# Patient Record
Sex: MALE | Race: AMERICAN INDIAN OR ALASKA NATIVE | NOT HISPANIC OR LATINO | Employment: FULL TIME | URBAN - METROPOLITAN AREA
[De-identification: names, ages, dates, MRNs, and addresses within clinical notes are randomized per-mention and may not be internally consistent; named-entity substitution may affect disease eponyms.]

---

## 2024-05-15 ENCOUNTER — OFFICE VISIT (OUTPATIENT)
Age: 44
End: 2024-05-15
Payer: COMMERCIAL

## 2024-05-15 VITALS
HEART RATE: 95 BPM | BODY MASS INDEX: 33.07 KG/M2 | DIASTOLIC BLOOD PRESSURE: 90 MMHG | SYSTOLIC BLOOD PRESSURE: 140 MMHG | WEIGHT: 231 LBS | HEIGHT: 70 IN | OXYGEN SATURATION: 97 % | TEMPERATURE: 94.6 F

## 2024-05-15 DIAGNOSIS — R52 PAIN: ICD-10-CM

## 2024-05-15 DIAGNOSIS — R53.83 OTHER FATIGUE: ICD-10-CM

## 2024-05-15 DIAGNOSIS — E66.9 OBESITY (BMI 30-39.9): ICD-10-CM

## 2024-05-15 DIAGNOSIS — L30.9 ECZEMA, UNSPECIFIED TYPE: ICD-10-CM

## 2024-05-15 DIAGNOSIS — Z13.6 ENCOUNTER FOR LIPID SCREENING FOR CARDIOVASCULAR DISEASE: ICD-10-CM

## 2024-05-15 DIAGNOSIS — Z13.220 ENCOUNTER FOR LIPID SCREENING FOR CARDIOVASCULAR DISEASE: ICD-10-CM

## 2024-05-15 DIAGNOSIS — B02.9 HERPES ZOSTER WITHOUT COMPLICATION: Primary | ICD-10-CM

## 2024-05-15 DIAGNOSIS — Z11.59 ENCOUNTER FOR HEPATITIS C SCREENING TEST FOR LOW RISK PATIENT: ICD-10-CM

## 2024-05-15 DIAGNOSIS — Z11.4 ENCOUNTER FOR SCREENING FOR HIV: ICD-10-CM

## 2024-05-15 DIAGNOSIS — R03.0 ELEVATED BLOOD PRESSURE READING: ICD-10-CM

## 2024-05-15 PROCEDURE — 99204 OFFICE O/P NEW MOD 45 MIN: CPT

## 2024-05-15 RX ORDER — PREDNISONE 10 MG/1
10 TABLET ORAL DAILY
COMMUNITY

## 2024-05-15 RX ORDER — GABAPENTIN 300 MG/1
300 CAPSULE ORAL 3 TIMES DAILY
Qty: 30 CAPSULE | Refills: 0 | Status: SHIPPED | OUTPATIENT
Start: 2024-05-15 | End: 2024-05-26 | Stop reason: SDUPTHER

## 2024-05-15 RX ORDER — CEPHALEXIN 500 MG/1
500 CAPSULE ORAL 4 TIMES DAILY
COMMUNITY

## 2024-05-15 RX ORDER — VALACYCLOVIR HYDROCHLORIDE 1 G/1
1000 TABLET, FILM COATED ORAL 3 TIMES DAILY
Qty: 21 TABLET | Refills: 0 | Status: SHIPPED | OUTPATIENT
Start: 2024-05-15 | End: 2024-05-22

## 2024-05-15 NOTE — PROGRESS NOTES
Ambulatory Visit  Name: Keith Stafford      : 1980      MRN: [id number]  Encounter Provider: Juan Antonio Perez PA-C  Encounter Date: 5/15/2024   Encounter department: [facility] [place]    Assessment & Plan   1. Herpes zoster without complication  Comments:  Discussed precautions to prevent spread.  Orders:  -     valACYclovir (VALTREX) 1,000 mg tablet; Take 1 tablet (1,000 mg total) by mouth 3 (three) times a day for 7 days  -     gabapentin (Neurontin) 300 mg capsule; Take 1 capsule (300 mg total) by mouth 3 (three) times a day for 10 days  2. Pain  -     gabapentin (Neurontin) 300 mg capsule; Take 1 capsule (300 mg total) by mouth 3 (three) times a day for 10 days  3. Elevated blood pressure reading  Comments:  Will recheck at next visit for annual in three months. Discussed can be pain related. No hx of htn  4. Other fatigue  -     Comprehensive metabolic panel; Future  -     CBC and differential; Future  5. Encounter for lipid screening for cardiovascular disease  -     Lipid panel; Future  6. Encounter for screening for HIV  -     HIV 1/2 AG/AB w Reflex [facility] for 2 yr old and above; Future  7. Encounter for hepatitis C screening test for low risk patient  -     Hepatitis C antibody; Future  8. Eczema, unspecified type  Comments:  Follows w derm has appt coming up  9. Obesity (BMI 30-39.9)  Comments:  In pmh, will discuss at [facility]. Labs ordered.      Depression Screening and Follow-up Plan: Patient was screened for depression during today's encounter. They screened negative with a PHQ-2 score of 0.      History of Present Illness     Spider bites Since last week, ones located on his lower back are black in the middle and has a red ring, on the right side of his private area are red and the bites are swollen, sensitive to the touch on his private area. Helped his son moves Thursday and they slept on the floor noticed the bites Friday. Son didn't have bites. Patient never saw spiders.    Satutday Sunday had chills, felt hot.   Went to urgent care given steroid and abx yesterday. Has been helping a little.    Insect Bite  Associated symptoms include fatigue and a rash. Pertinent negatives include no chest pain, coughing or fever.       Review of Systems   Constitutional:  Positive for fatigue. Negative for activity change, appetite change, fever and unexpected weight change.   HENT: Negative.     Eyes: Negative.    Respiratory:  Negative for cough, chest tightness and shortness of breath.    Cardiovascular:  Negative for chest pain, palpitations and leg swelling.   Gastrointestinal: Negative.    Endocrine: Negative.    Genitourinary: Negative.    Musculoskeletal: Negative.    Skin:  Positive for rash. Negative for color change, pallor and wound.   Neurological: Negative.    Hematological: Negative.    Psychiatric/Behavioral: Negative.       Pertinent Medical History         Medical History Reviewed by provider this encounter:  Tobacco  Allergies  Meds  Problems  Med Hx  Surg Hx  Fam Hx       Past Medical History   History reviewed. No pertinent past medical history.  History reviewed. No pertinent surgical history.  History reviewed. No pertinent family history.  Current Outpatient Medications on File Prior to Visit   Medication Sig Dispense Refill    cephalexin (KEFLEX) 500 mg capsule Take 500 mg by mouth 4 (four) times a day For 7 days      predniSONE 10 mg tablet Take 10 mg by mouth daily Take 3 tabs  a day times 1 day, 2 tabs a day  times 2 days then 1 tab a day for 2 days       No current facility-administered medications on file prior to visit.   No Known Allergies   Current Outpatient Medications on File Prior to Visit   Medication Sig Dispense Refill    cephalexin (KEFLEX) 500 mg capsule Take 500 mg by mouth 4 (four) times a day For 7 days      predniSONE 10 mg tablet Take 10 mg by mouth daily Take 3 tabs  a day times 1 day, 2 tabs a day  times 2 days then 1 tab a day for 2 days    "    No current facility-administered medications on file prior to visit.      Social History     Tobacco Use    Smoking status: Never    Smokeless tobacco: Never   Vaping Use    Vaping status: Never Used   Substance and Sexual Activity    Alcohol use: Yes     Comment: socially    Drug use: Never    Sexual activity: Not on file     Objective     /90   Pulse 95   Temp (!) 94.6 °F (34.8 °C)   Ht 5' 10\" (1.778 m)   Wt 105 kg (231 lb)   SpO2 97%   BMI 33.15 kg/m²     Physical Exam  Vitals and nursing note reviewed.   Constitutional:       General: He is not in acute distress.     Appearance: He is obese. He is not ill-appearing, toxic-appearing or diaphoretic.   HENT:      Head: Normocephalic and atraumatic.      Nose: Nose normal.   Eyes:      General: No scleral icterus.        Right eye: No discharge.         Left eye: No discharge.      Extraocular Movements: Extraocular movements intact.      Conjunctiva/sclera: Conjunctivae normal.   Cardiovascular:      Rate and Rhythm: Normal rate and regular rhythm.      Heart sounds: No murmur heard.  Pulmonary:      Effort: Pulmonary effort is normal.      Breath sounds: Normal breath sounds.   Genitourinary:             Comments: Vesicles, large and raised in various healing stages, some crusted over. Never had shingles before.    Is itchy and painful  Musculoskeletal:      Cervical back: Normal range of motion and neck supple.   Skin:     Findings: No rash.   Neurological:      Mental Status: He is alert. Mental status is at baseline.   Psychiatric:         Mood and Affect: Mood normal.         Behavior: Behavior normal.         Thought Content: Thought content normal.         Judgment: Judgment normal.       Juan Antonio Perez PA-C      "

## 2024-05-16 ENCOUNTER — TELEPHONE (OUTPATIENT)
Age: 44
End: 2024-05-16

## 2024-05-16 NOTE — TELEPHONE ENCOUNTER
Pt requesting stronger pain medication due to his newly diagnosed shingles. Pt states the medication ( gabapentin (Neurontin) 300 mg capsule ) isn't working.  Please further advise patient @[phone].  Thank you

## 2024-05-18 ENCOUNTER — NURSE TRIAGE (OUTPATIENT)
Dept: OTHER | Facility: OTHER | Age: 44
End: 2024-05-18

## 2024-05-18 NOTE — TELEPHONE ENCOUNTER
"Regarding: shingles/med request  ----- Message from Juana LAMBERT sent at 5/18/2024 12:43 PM EDT -----  Pt states \"I have shingles and I need steroids\"    "

## 2024-05-18 NOTE — TELEPHONE ENCOUNTER
"Answer Assessment - Initial Assessment Questions  1. APPEARANCE of RASH: \"Describe the rash.\"       Rash has spread since last night.     2. LOCATION: \"Where is the rash located?\"       Right buttock and down to anus and testicles    3. ONSET: \"When did the rash start?\"       Started one week ago.     4. ITCHING: \"Does the rash itch?\" If Yes, ask: \"How bad is the itch?\"  (Scale 1-10; or mild, moderate, severe)      Yes    5. PAIN: \"Does the rash hurt?\" If Yes, ask: \"How bad is the pain?\"  (Scale 1-10; or mild, moderate, severe)      5/10 but can go as high as 10/10    6. OTHER SYMPTOMS: \"Do you have any other symptoms?\" (e.g., fever)      No fever.    Protocols used: Shingles-ADULT-AH      Pt states before being diagnosed with Shingles he was on a prednisone taper for suspected spider bites. Pt states that his rash has spread and he is having an increase in pain. Is asking if he should be restarted on steroid?    Per on call PCP- steroids would not be advised at this time and can make symptoms worse since they suppress immune response. Pt should continue his current course of Valtrex and gabapentin and can take Tylenol 1000mg Q 6 hrs round the clock for up to 4g/day. Can also continue using lidocaine cream as previously recommended.     Pt aware and verbalized understanding.   "

## 2024-05-26 ENCOUNTER — NURSE TRIAGE (OUTPATIENT)
Dept: OTHER | Facility: OTHER | Age: 44
End: 2024-05-26

## 2024-05-26 DIAGNOSIS — R52 PAIN: ICD-10-CM

## 2024-05-26 DIAGNOSIS — B02.9 HERPES ZOSTER WITHOUT COMPLICATION: ICD-10-CM

## 2024-05-26 RX ORDER — GABAPENTIN 300 MG/1
300 CAPSULE ORAL 3 TIMES DAILY
Qty: 30 CAPSULE | Refills: 0 | Status: SHIPPED | OUTPATIENT
Start: 2024-05-26 | End: 2024-06-05

## 2024-05-26 NOTE — TELEPHONE ENCOUNTER
On call provider contacted, gave a verbal order to send in a refill on the patient's Gabapentin prescription. Prescription refill sent to the patient's designated pharmacy. Patient made aware and verbalized understanding.  Reason for Disposition  • [1] Prescription refill request for ESSENTIAL medicine (i.e., likelihood of harm to patient if not taken) AND [2] triager unable to refill per department policy    Protocols used: Medication Refill and Renewal Call-ADULT-

## 2024-05-26 NOTE — TELEPHONE ENCOUNTER
"Answer Assessment - Initial Assessment Questions  1. DRUG NAME: \"What medicine do you need to have refilled?\"      Gabapentin     2. REFILLS REMAINING: \"How many refills are remaining?\" (Note: The label on the medicine or pill bottle will show how many refills are remaining. If there are no refills remaining, then a renewal may be needed.)      0    3. EXPIRATION DATE: \"What is the expiration date?\" (Note: The label states when the prescription will , and thus can no longer be refilled.)       24    4. PRESCRIBING HCP: \"Who prescribed it?\" Reason: If prescribed by specialist, call should be referred to that group.      PCP    5. SYMPTOMS: \"Do you have any symptoms?\"      Currently has shingles    Protocols used: Medication Refill and Renewal Call-ADULT-    "

## 2024-05-26 NOTE — TELEPHONE ENCOUNTER
"Regarding: Urgent Refill  ----- Message from Margarita LION sent at 5/26/2024 12:31 PM EDT -----  \" I ran out of my Medication gabapentin and I would like to get it filled today.\"    "

## 2024-05-31 ENCOUNTER — TELEPHONE (OUTPATIENT)
Age: 44
End: 2024-05-31

## 2024-05-31 ENCOUNTER — NURSE TRIAGE (OUTPATIENT)
Dept: OTHER | Facility: OTHER | Age: 44
End: 2024-05-31

## 2024-05-31 DIAGNOSIS — B02.9 HERPES ZOSTER WITHOUT COMPLICATION: Primary | ICD-10-CM

## 2024-05-31 RX ORDER — ACYCLOVIR 50 MG/G
OINTMENT TOPICAL 2 TIMES DAILY
Qty: 30 G | Refills: 0 | Status: SHIPPED | OUTPATIENT
Start: 2024-05-31 | End: 2024-06-14

## 2024-05-31 NOTE — TELEPHONE ENCOUNTER
Pt called asking if the message he sent this morning was answered.    Pt aware message has not been addressed yet today and someone will be In contact with him

## 2024-05-31 NOTE — TELEPHONE ENCOUNTER
"Reason for Disposition  • [1] Caller has URGENT medicine question about med that PCP or specialist prescribed AND [2] triager unable to answer question    Answer Assessment - Initial Assessment Questions  1. NAME of MEDICATION: \"What medicine are you calling about?\"      Acyclovir ointment     2. QUESTION: \"What is your question?\" (e.g., medication refill, side effect)      \"The medication is expensive, wondering if there is an alternative?\"    3. PRESCRIBING HCP: \"Who prescribed it?\" Reason: if prescribed by specialist, call should be referred to that group.      Juan Antonio Perez    Protocols used: Medication Question Call-ADULT-AH    "

## 2024-05-31 NOTE — TELEPHONE ENCOUNTER
"Regarding: Prescription problem  ----- Message from James JACINTO sent at 5/31/2024  5:51 PM EDT -----  \"The prescription for acyclovir (ZOVIRAX) 5 % ointment is $64, I wanted to verified is there was and alternative medication.\"    "

## 2024-05-31 NOTE — TELEPHONE ENCOUNTER
On call provider notified. On call provider stated there is no other alternative. Notified patient and patient verbalized understanding. Patient has no further questions at this time.

## 2024-07-12 LAB
ALBUMIN SERPL-MCNC: 4.5 G/DL (ref 3.6–5.1)
ALBUMIN/GLOB SERPL: 1.4 (CALC) (ref 1–2.5)
ALP SERPL-CCNC: 71 U/L (ref 36–130)
ALT SERPL-CCNC: 16 U/L (ref 9–46)
AST SERPL-CCNC: 20 U/L (ref 10–40)
BASOPHILS # BLD AUTO: 70 CELLS/UL (ref 0–200)
BASOPHILS NFR BLD AUTO: 0.9 %
BILIRUB SERPL-MCNC: 1 MG/DL (ref 0.2–1.2)
BUN SERPL-MCNC: 11 MG/DL (ref 7–25)
BUN/CREAT SERPL: NORMAL (CALC) (ref 6–22)
CALCIUM SERPL-MCNC: 9.9 MG/DL (ref 8.6–10.3)
CHLORIDE SERPL-SCNC: 101 MMOL/L (ref 98–110)
CO2 SERPL-SCNC: 30 MMOL/L (ref 20–32)
CREAT SERPL-MCNC: 1.06 MG/DL (ref 0.6–1.29)
EOSINOPHIL # BLD AUTO: 328 CELLS/UL (ref 15–500)
EOSINOPHIL NFR BLD AUTO: 4.2 %
ERYTHROCYTE [DISTWIDTH] IN BLOOD BY AUTOMATED COUNT: 13.1 % (ref 11–15)
GFR/BSA.PRED SERPLBLD CYS-BASED-ARV: 89 ML/MIN/1.73M2
GLOBULIN SER CALC-MCNC: 3.2 G/DL (CALC) (ref 1.9–3.7)
GLUCOSE SERPL-MCNC: 94 MG/DL (ref 65–99)
HCT VFR BLD AUTO: 42.9 % (ref 38.5–50)
HCV AB SERPL QL IA: NORMAL
HGB BLD-MCNC: 14.6 G/DL (ref 13.2–17.1)
HIV 1+2 AB+HIV1 P24 AG SERPL QL IA: NORMAL
LYMPHOCYTES # BLD AUTO: 1973 CELLS/UL (ref 850–3900)
LYMPHOCYTES NFR BLD AUTO: 25.3 %
MCH RBC QN AUTO: 28.6 PG (ref 27–33)
MCHC RBC AUTO-ENTMCNC: 34 G/DL (ref 32–36)
MCV RBC AUTO: 84.1 FL (ref 80–100)
MONOCYTES # BLD AUTO: 686 CELLS/UL (ref 200–950)
MONOCYTES NFR BLD AUTO: 8.8 %
NEUTROPHILS # BLD AUTO: 4742 CELLS/UL (ref 1500–7800)
NEUTROPHILS NFR BLD AUTO: 60.8 %
PLATELET # BLD AUTO: 363 THOUSAND/UL (ref 140–400)
PMV BLD REES-ECKER: 11.2 FL (ref 7.5–12.5)
POTASSIUM SERPL-SCNC: 5 MMOL/L (ref 3.5–5.3)
PROT SERPL-MCNC: 7.7 G/DL (ref 6.1–8.1)
RBC # BLD AUTO: 5.1 MILLION/UL (ref 4.2–5.8)
SODIUM SERPL-SCNC: 138 MMOL/L (ref 135–146)
WBC # BLD AUTO: 7.8 THOUSAND/UL (ref 3.8–10.8)

## 2024-08-08 ENCOUNTER — RA CDI HCC (OUTPATIENT)
Dept: OTHER | Facility: HOSPITAL | Age: 44
End: 2024-08-08

## 2025-02-23 ENCOUNTER — NURSE TRIAGE (OUTPATIENT)
Dept: OTHER | Facility: OTHER | Age: 45
End: 2025-02-23

## 2025-02-23 ENCOUNTER — DOCUMENTATION (OUTPATIENT)
Age: 45
End: 2025-02-23

## 2025-02-23 DIAGNOSIS — B02.9 HERPES ZOSTER WITHOUT COMPLICATION: Primary | ICD-10-CM

## 2025-02-23 RX ORDER — VALACYCLOVIR HYDROCHLORIDE 1 G/1
1000 TABLET, FILM COATED ORAL 3 TIMES DAILY
Qty: 21 TABLET | Refills: 0 | Status: SHIPPED | OUTPATIENT
Start: 2025-02-23 | End: 2025-03-02

## 2025-02-23 NOTE — TELEPHONE ENCOUNTER
"Answer Assessment - Initial Assessment Questions  1. APPEARANCE of RASH: \"Describe the rash.\"             Denies any rash yet     2. LOCATION: \"Where is the rash located?\"             On the right side of the penis       Lower back on the right side     3. ONSET: \"When did the rash start?\"             Last night started with tingling and discomfort in those areas    4. ITCHING: \"Does the rash itch?\" If Yes, ask: \"How bad is the itch?\"  (Scale 1-10; or mild, moderate, severe)             Denies     5. PAIN: \"Does the rash hurt?\" If Yes, ask: \"How bad is the pain?\"  (Scale 0-10; or none, mild, moderate, severe)            Current pain is a 1-5/10      Burning sensation     6. OTHER SYMPTOMS: \"Do you have any other symptoms?\" (e.g., fever)            Denies    Protocols used: Shingles (Zoster)-Adult-    "

## 2025-02-23 NOTE — TELEPHONE ENCOUNTER
Patient calling in with concerns he is developing Shingles again. Stated he had it over the summer and is starting with the same symptoms in the same areas. Has been experiencing tingling and burning sensation on the right side of his penis and right lower back. Stated the symptoms started last night. He denies any rash yet or other symptoms at this time. Requesting to have medication sent to pharmacy. On call provider contacted, stated a prescription for Valtrex has been sent to his pharmacy. Recommends follow up with the office this week. Patient made aware and an appointment was scheduled for this coming Wednesday at 3 PM. Instructed patient to call/follow up sooner if any worsening symptoms develop. Patient verbalized understanding.       Reason for Disposition   Shingles, questions about    Protocols used: Shingles (Zoster)-Adult-

## 2025-02-27 ENCOUNTER — OFFICE VISIT (OUTPATIENT)
Age: 45
End: 2025-02-27
Payer: COMMERCIAL

## 2025-02-27 ENCOUNTER — TELEPHONE (OUTPATIENT)
Age: 45
End: 2025-02-27

## 2025-02-27 VITALS
OXYGEN SATURATION: 98 % | SYSTOLIC BLOOD PRESSURE: 138 MMHG | HEIGHT: 70 IN | DIASTOLIC BLOOD PRESSURE: 76 MMHG | HEART RATE: 81 BPM | WEIGHT: 233 LBS | BODY MASS INDEX: 33.36 KG/M2 | RESPIRATION RATE: 15 BRPM | TEMPERATURE: 98 F

## 2025-02-27 DIAGNOSIS — Z00.00 HEALTHCARE MAINTENANCE: Primary | ICD-10-CM

## 2025-02-27 DIAGNOSIS — L30.9 ECZEMA, UNSPECIFIED TYPE: ICD-10-CM

## 2025-02-27 DIAGNOSIS — Z13.220 ENCOUNTER FOR SCREENING FOR LIPID DISORDER: ICD-10-CM

## 2025-02-27 DIAGNOSIS — B35.6 TINEA CRURIS: ICD-10-CM

## 2025-02-27 DIAGNOSIS — B02.9 HERPES ZOSTER WITHOUT COMPLICATION: ICD-10-CM

## 2025-02-27 DIAGNOSIS — R03.0 ELEVATED BLOOD PRESSURE READING: ICD-10-CM

## 2025-02-27 PROCEDURE — 99214 OFFICE O/P EST MOD 30 MIN: CPT

## 2025-02-27 PROCEDURE — 99396 PREV VISIT EST AGE 40-64: CPT

## 2025-02-27 RX ORDER — ACYCLOVIR 50 MG/G
OINTMENT TOPICAL 2 TIMES DAILY
Qty: 14 G | Refills: 0 | Status: SHIPPED | OUTPATIENT
Start: 2025-02-27 | End: 2025-03-06

## 2025-02-27 RX ORDER — KETOCONAZOLE 20 MG/G
CREAM TOPICAL DAILY
Qty: 15 G | Refills: 0 | Status: SHIPPED | OUTPATIENT
Start: 2025-02-27 | End: 2025-03-06

## 2025-02-27 RX ORDER — PREGABALIN 75 MG/1
75 CAPSULE ORAL 2 TIMES DAILY
Qty: 20 CAPSULE | Refills: 0 | Status: CANCELLED | OUTPATIENT
Start: 2025-02-27 | End: 2025-03-09

## 2025-02-27 NOTE — PROGRESS NOTES
Adult Annual Physical  Name: Keith Stafford      : 1980      MRN: [id number]  Encounter Provider: Juan Antonio Perez PA-C  Encounter Date: 2025   Encounter department: [facility]  Healthcare maintenance  Health maintenance reviewed  -complete labs in 3-4 months   Orders:    CBC and differential; Future    Comprehensive metabolic panel; Future    Herpes zoster without complication  Was started on valtrex 35, continue until 3/2/25 as prescribed   -Would like to restart acyclovir ointment because it helped the most out of everything we tried before  -OTC tylenol prn following label instructions  Orders:    acyclovir (ZOVIRAX) 5 % ointment; Apply topically 2 (two) times a day for 7 days    Elevated blood pressure reading  Slightly high today at 138/76, likely pain related  Continue to monitor  Lifestyle modifications reviewed        Eczema, unspecified type  Controlled, follows with dermatology        Tinea cruris  Semi annular rash of the left groin, discussed possibly a separate issue and itchy   Begin ketoconazole for 7 days   Orders:    ketoconazole (NIZORAL) 2 % cream; Apply topically daily for 7 days    Encounter for screening for lipid disorder    Orders:    Lipid panel; Future    Immunizations and preventive care screenings were discussed with patient today. Appropriate education was printed on patient's after visit summary.        Counseling:  Alcohol/drug use: discussed moderation in alcohol intake, the recommendations for healthy alcohol use, and avoidance of illicit drug use.  Dental Health: discussed importance of regular tooth brushing, flossing, and dental visits.  Exercise: the importance of regular exercise/physical activity was discussed. Recommend exercise 3-5 times per week for at least 30 minutes.       Depression Screening and Follow-up Plan: Patient was screened for depression during today's encounter. They screened negative with  "a PHQ-2 score of 0.          History of Present Illness     Adult Annual Physical:  Patient presents for annual physical. Patient presents today for routine follow up and shingles flare similar to previous but on the left this time. with groin rash that started about a week ago, painful burning that comes and goes is getting better   Was prescribed valtrex over the phone a few days ago   Similar pain and pattern to the shingles diagnosis last spring   Has been more stressed lately, wife with health issues, work and kids .     Diet and Physical Activity:  - Diet/Nutrition: well balanced diet.  - Exercise: strength training exercises and moderate cardiovascular exercise.    Depression Screening:  - PHQ-2 Score: 0    General Health:  - Sleep: sleeps well.  - Hearing: normal hearing right ear and normal hearing left ear.  - Vision: no vision problems.     Health:    - Urinary symptoms: none.     Review of Systems   Constitutional:  Negative for activity change, diaphoresis, fatigue and fever.   HENT: Negative.     Eyes: Negative.    Respiratory: Negative.  Negative for cough, chest tightness and shortness of breath.    Cardiovascular:  Negative for chest pain, palpitations and leg swelling.   Gastrointestinal: Negative.    Endocrine: Negative.  Negative for polydipsia, polyphagia and polyuria.   Genitourinary: Negative.  Negative for dysuria, flank pain, frequency, hematuria and urgency.   Musculoskeletal: Negative.  Negative for back pain, joint swelling, neck pain and neck stiffness.   Skin:  Positive for rash.   Neurological: Negative.  Negative for dizziness, weakness, light-headedness and headaches.   Hematological:  Negative for adenopathy.   Psychiatric/Behavioral: Negative.  Negative for confusion and sleep disturbance. The patient is not nervous/anxious.          Objective   /76 (Patient Position: Sitting, Cuff Size: Large)   Pulse 81   Temp (!) 94.6 °F (34.8 °C)   Ht 5' 10\" (1.778 m)   Wt 106 kg " "(233 lb)   SpO2 98%   BMI 33.43 kg/m²     Physical Exam  Vitals and nursing note reviewed.   Constitutional:       General: He is not in acute distress.     Appearance: He is normal weight. He is not ill-appearing, toxic-appearing or diaphoretic.   HENT:      Head: Normocephalic and atraumatic.      Right Ear: External ear normal.      Left Ear: External ear normal.      Nose: Nose normal.   Eyes:      General: No scleral icterus.        Right eye: No discharge.         Left eye: No discharge.      Extraocular Movements: Extraocular movements intact.      Conjunctiva/sclera: Conjunctivae normal.   Neck:      Vascular: No carotid bruit.   Cardiovascular:      Rate and Rhythm: Normal rate and regular rhythm.      Heart sounds: No murmur heard.  Pulmonary:      Effort: Pulmonary effort is normal. No respiratory distress.      Breath sounds: Normal breath sounds. No wheezing or rales.   Musculoskeletal:      Cervical back: Normal range of motion and neck supple.      Right lower leg: No edema.      Left lower leg: No edema.   Skin:     Findings: Rash present.             Comments: Semiannular hyperpigmented rash flat with overlying 1-3mm small vesicles on the left side inguinal area only, some crusting    Neurological:      Mental Status: He is alert. Mental status is at baseline.   Psychiatric:         Mood and Affect: Mood normal.         Behavior: Behavior normal.         Thought Content: Thought content normal.         Judgment: Judgment normal.       Portions of the record may have been created with voice recognition software. Occasional wrong word or \"sound a like\" substitutions may have occurred due to the inherent limitations of voice recognition software. Read the chart carefully and recognize, using context, where substitutions have occurred.    "

## 2025-02-27 NOTE — TELEPHONE ENCOUNTER
Pt c/o possible shingles and requesting an appointment for today with PCP.    Pt scheduled for today with PCP at 1:20 pm    Pt will bring insurance cards and photo ID to appointment.

## 2025-02-27 NOTE — ASSESSMENT & PLAN NOTE
Slightly high today at 138/76, likely pain related  Continue to monitor  Lifestyle modifications reviewed

## 2025-04-15 DIAGNOSIS — J30.2 SEASONAL ALLERGIES: Primary | ICD-10-CM

## 2025-04-15 RX ORDER — FLUTICASONE PROPIONATE 50 MCG
1 SPRAY, SUSPENSION (ML) NASAL DAILY
Qty: 18.2 ML | Refills: 1 | Status: SHIPPED | OUTPATIENT
Start: 2025-04-15

## 2025-04-29 DIAGNOSIS — L30.9 ECZEMA, UNSPECIFIED TYPE: ICD-10-CM

## 2025-04-29 DIAGNOSIS — L30.9 ECZEMA, UNSPECIFIED TYPE: Primary | ICD-10-CM

## 2025-04-29 RX ORDER — FLUOCINOLONE ACETONIDE 0.11 MG/ML
OIL TOPICAL DAILY
Qty: 118.28 ML | Refills: 0 | Status: SHIPPED | OUTPATIENT
Start: 2025-04-29 | End: 2025-05-06

## 2025-04-30 ENCOUNTER — TELEPHONE (OUTPATIENT)
Age: 45
End: 2025-04-30

## 2025-04-30 DIAGNOSIS — L30.9 ECZEMA, UNSPECIFIED TYPE: Primary | ICD-10-CM

## 2025-04-30 RX ORDER — FLUOCINOLONE ACETONIDE 0.25 MG/G
CREAM TOPICAL
Refills: 0 | OUTPATIENT
Start: 2025-04-30

## 2025-04-30 NOTE — TELEPHONE ENCOUNTER
PA for Fluocinolone Acetonide Scalp 0.01%SUBMITTED           via    [x][facility]-KEY: [id number]  [x]PA sent as URGENT    All office notes, labs and other pertaining documents and studies sent. Clinical questions answered. Awaiting determination from insurance company.     Turnaround time for your insurance to make a decision on your Prior Authorization can take 7-21 business days.

## 2025-04-30 NOTE — TELEPHONE ENCOUNTER
Reason for call:   [x] Prior Auth  [] Other:     Caller:  [] Patient  [x] Pharmacy  Name: [id number]   Address: [address] [place] [address], [place] [address]   Callback Number: [phone]     Medication: Fluocinolone Acetonide Scalp (Derma-Smoothe/FS Scalp) 0.01 % OIL     Dose/Frequency:  Apply topically in the morning for 7 days     Quantity: 118.28 mL     Ordering Provider:   [x] PCP/Provider - Juan Antonio Perez PA-C

## 2025-05-05 RX ORDER — FLUOCINOLONE ACETONIDE 0.25 MG/G
CREAM TOPICAL 2 TIMES DAILY
Qty: 60 G | Refills: 3 | Status: SHIPPED | OUTPATIENT
Start: 2025-05-05

## 2025-05-05 NOTE — TELEPHONE ENCOUNTER
PA for Fluocinolone Acetonide Scalp 0.01%  DENIED    Reason:         Message sent to office clinical pool Yes    Denial letter scanned into Media Yes    We can gladly do an appeal but the process can take about 30-60 days to provide determination. Please Schedule a Peer to Peer at phone [phone] . If an appeal is truly warranted please have Provider send clinical documentation to the PA department to support the appeal.     **Please follow up with your patient regarding denial and next steps**

## 2025-08-06 DIAGNOSIS — B35.6 TINEA CRURIS: ICD-10-CM

## 2025-08-08 RX ORDER — KETOCONAZOLE 20 MG/G
CREAM TOPICAL
Qty: 60 G | OUTPATIENT
Start: 2025-08-08